# Patient Record
Sex: MALE | Race: WHITE | Employment: FULL TIME | URBAN - METROPOLITAN AREA
[De-identification: names, ages, dates, MRNs, and addresses within clinical notes are randomized per-mention and may not be internally consistent; named-entity substitution may affect disease eponyms.]

---

## 2018-10-11 ENCOUNTER — HOSPITAL ENCOUNTER (EMERGENCY)
Facility: CLINIC | Age: 28
Discharge: HOME | End: 2018-10-11
Attending: PHYSICIAN ASSISTANT | Admitting: PHYSICIAN ASSISTANT
Payer: COMMERCIAL

## 2018-10-11 VITALS
WEIGHT: 170 LBS | HEIGHT: 73 IN | RESPIRATION RATE: 18 BRPM | SYSTOLIC BLOOD PRESSURE: 157 MMHG | TEMPERATURE: 97.9 F | DIASTOLIC BLOOD PRESSURE: 77 MMHG | BODY MASS INDEX: 22.53 KG/M2 | HEART RATE: 63 BPM | OXYGEN SATURATION: 99 %

## 2018-10-11 DIAGNOSIS — H18.829 KERATITIS SECONDARY TO CONTACT LENS: ICD-10-CM

## 2018-10-11 DIAGNOSIS — H16.8 KERATITIS SECONDARY TO CONTACT LENS: ICD-10-CM

## 2018-10-11 PROCEDURE — G0463 HOSPITAL OUTPT CLINIC VISIT: HCPCS | Performed by: FAMILY MEDICINE

## 2018-10-11 PROCEDURE — 99204 OFFICE O/P NEW MOD 45 MIN: CPT | Mod: Z6 | Performed by: FAMILY MEDICINE

## 2018-10-11 RX ORDER — MOXIFLOXACIN 5 MG/ML
1 SOLUTION/ DROPS OPHTHALMIC
Qty: 1 BOTTLE | Refills: 0 | Status: SHIPPED | OUTPATIENT
Start: 2018-10-11 | End: 2018-10-11

## 2018-10-11 RX ORDER — TETRACAINE HYDROCHLORIDE 5 MG/ML
1-2 SOLUTION OPHTHALMIC ONCE
Status: DISCONTINUED | OUTPATIENT
Start: 2018-10-11 | End: 2018-10-11 | Stop reason: HOSPADM

## 2018-10-11 RX ORDER — OFLOXACIN 3 MG/ML
1 SOLUTION/ DROPS OPHTHALMIC EVERY 4 HOURS
Qty: 1 BOTTLE | Refills: 0 | Status: SHIPPED | OUTPATIENT
Start: 2018-10-11

## 2018-10-11 ASSESSMENT — ENCOUNTER SYMPTOMS
EYE REDNESS: 0
EYE PAIN: 1
EYE ITCHING: 0
EYE DISCHARGE: 0
PHOTOPHOBIA: 0

## 2018-10-11 ASSESSMENT — VISUAL ACUITY
OS: 20/20
OD: 20/20

## 2018-10-11 NOTE — ED AVS SNAPSHOT
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      Follow-up Information     [facility].    Why:  for further evaluation of eye.         Discharge Instructions       Patient to use eye drops as directed.     [facility] tomorrow for follow up (information given) patient to call in the morning.     Patient to not wear contacts until cleared by eye specialist.   Patient to return to Emergency Room if visual changes, fevers, or change in symptoms occur.         24 Hour Appointment Hotline       To make an appointment at any [facility] clinic, call [phone] ([phone]). If you don't have a family doctor or clinic, we will help you find one. [facility] clinics are conveniently located to serve the needs of you and your family.          ED Discharge Orders     OPHTHALMOLOGY ADULT REFERRAL       Your provider has referred you to: [facility]: [facility] [place] [phone]   [email or web address]  [facility] [phone]   [email or web address]  [place]  [phone]   [email or web address]  [place] [phone]   [email or web address]    Please be aware that coverage of these services is subject to the terms and limitations of your health insurance plan.  Call member services at your health plan with any benefit or coverage questions.      Please bring the following with you to your appointment:    (1) Any X-Rays, CTs or MRIs which have been performed.  Contact the facility where they were done  to arrange for  prior to your scheduled appointment.    (2) List of current medications  (3) This referral request   (4) Any documents/labs given to you for this referral                     Review of your medicines      START taking        Dose / Directions Last dose taken    moxifloxacin 0.5 % ophthalmic solution   Commonly known as:  VIGAMOX   Dose:  1 drop   Quantity:  1 Bottle        Apply 1 drop to eye every 4 hours (while awake) for 7 days   Refills:  0                Prescriptions were sent or printed at these locations (1 Prescription)                   [facility] [place], [place] - [address]   [address] [address]    Telephone:  [phone]   Fax:  [phone]   Hours:                  E-Prescribed (1 of 1)         moxifloxacin (VIGAMOX) 0.5 % ophthalmic solution                Orders Needing Specimen Collection     None      Pending Results     No orders found from 10/9/2018 to 10/12/2018.            Pending Culture Results     No orders found from 10/9/2018 to 10/12/2018.            Pending Results Instructions     If you had any lab results that were not finalized at the time of your Discharge, you can call the ED Lab Result RN at [phone]. You will be contacted by this team for any positive Lab results or changes in treatment. The nurses are available 7 days a week from 10A to 6:30P.  You can leave a message 24 hours per day and they will return your call.        Test Results From Your Hospital Stay               Thank you for choosing [facility]       Thank you for choosing [facility] for your care. Our goal is always to provide you with excellent care. Hearing back from our patients is one way we can continue to improve our services. Please take a few minutes to complete the written survey that you may receive in the mail after you visit with us. Thank you!        [employer] Information     [employer] lets you send messages to your doctor, view your test  "results, renew your prescriptions, schedule appointments and more. To sign up, go to [email or web address] . Click on \"Log in\" on the left side of the screen, which will take you to the Welcome page. Then click on \"Sign up Now\" on the right side of the page.     You will be asked to enter the access code listed below, as well as some personal information. Please follow the directions to create your username and password.     Your access code is: [email or web address]  Expires: 2019  8:02 PM     Your access code will  in 90 days. If you need help or a new code, please call your [facility] clinic or [phone].        Care EveryWhere ID     This is your Care EveryWhere ID. This could be used by other organizations to access your [facility] medical records  [id number]        Equal Access to Services     ROLO HADDAD : Haddominic Chavez, franco be, michele villanuevaalelfego marshall, simi rothman . So [facility] [phone].    ATENCIÓN: Si habla español, tiene a bauer disposición servicios gratuitos de asistencia lingüística. Llame al [phone].    We comply with applicable federal civil rights laws and [place] laws. We do not discriminate on the basis of race, color, national origin, age, disability, sex, sexual orientation, or gender identity.            After Visit Summary       This is your record. Keep this with you and show to your community pharmacist(s) and doctor(s) at your next visit.                  "

## 2018-10-11 NOTE — ED AVS SNAPSHOT
[facility] Emergency Department    [address] [address]    Phone:  [phone]    Fax:  [phone]                                       Patrick Peters   MRN: [id number]    Department:  [facility] Emergency Department   Date of Visit:  10/11/2018           After Visit Summary Signature Page     I have received my discharge instructions, and my questions have been answered. I have discussed any challenges I see with this plan with the nurse or doctor.    ..........................................................................................................................................  Patient/Patient Representative Signature      ..........................................................................................................................................  Patient Representative Print Name and Relationship to Patient    ..................................................               ................................................  Date                                   Time    ..........................................................................................................................................  Reviewed by Signature/Title    ...................................................              ..............................................  Date                                               Time          22EPIC Rev 08/18

## 2018-10-12 NOTE — ED PROVIDER NOTES
History     Chief Complaint   Patient presents with     Eye Problem     PT WEARS CONTACTS, THINKS A FB GOT UNDER CONTACT AND SCRATCHED EYE     HPI     Chief Complaint:   Chief Complaint   Patient presents with     Eye Problem     PT WEARS CONTACTS, THINKS A FB GOT UNDER CONTACT AND SCRATCHED EYE     History of Present Illness:  Patrick Peters is a 28 year old male who presents complaining of right eye pain that started today while driving home from work. Patient states he wears contacts while sleeping because they are day and night contacts. Patient states when he got home he took out his contacts. Patient concerned he may have gotten something under the eye. Patient has had a corneal abrasion and infection in the past and these symptoms are similar to corneal abrasion in past.   Onset/timing: sudden, unchanged.    Associated Signs and Symptoms: none  Treatment measures tried include: none  Contact wearer : Yes     last tetanus booster within past 5 years ago per patient; patient declined tetanus booster in office today.     Patient denies blurry vision or double vision, fevers, eyelid swelling, redness to eye, crusting or matting.     Problem list, Medication list, Allergies, and Medical/Social/Surgical histories reviewed in [facility] and updated as appropriate.          Patient declined tetanus states it was within past 5 years.     Problem List:    There are no active problems to display for this patient.       Past Medical History:    History reviewed. No pertinent past medical history.    Past Surgical History:    History reviewed. No pertinent surgical history.    Family History:    No family history on file.    Social History:  Marital Status:    Social History   Substance Use Topics     Smoking status: Light Tobacco Smoker     Smokeless tobacco: Never Used     Alcohol use Yes        Medications:      ofloxacin (OCUFLOX) 0.3 % ophthalmic solution         Review of Systems   Eyes: Positive for pain. Negative for  "photophobia, discharge, redness, itching and visual disturbance.   All other systems reviewed and are negative.      Physical Exam   BP: 157/77  Pulse: 63  Temp: 97.9  F (36.6  C)  Resp: 18  Height: 185.4 cm (6' 1\")  Weight: 77.1 kg (170 lb)  SpO2: 99 %  Visual Acuity-Left: 20/20  Visual Acuity-Right: 20/20      Physical Exam   Constitutional: He is oriented to person, place, and time. Vital signs are normal. He appears well-developed and well-nourished. He is cooperative. No distress.   HENT:   Head: Normocephalic and atraumatic.   Eyes: Conjunctivae, EOM and lids are normal. Pupils are equal, round, and reactive to light. Lids are everted and swept, no foreign bodies found. Right eye exhibits no chemosis, no discharge, no exudate and no hordeolum. No foreign body present in the right eye. Left eye exhibits no discharge and no exudate. Right conjunctiva is not injected. Right conjunctiva has no hemorrhage. Left conjunctiva is not injected. Left conjunctiva has no hemorrhage. No scleral icterus. Right eye exhibits normal extraocular motion and no nystagmus. Left eye exhibits normal extraocular motion and no nystagmus.   Slit lamp exam:       The right eye shows corneal ulcer (with white cloudy appearance noted at the 12 O'Clock position with clark lamp; Dr. Mann did the slit lamp exam . ) and fluorescein uptake. The right eye shows no corneal flare, no foreign body, no hyphema and no anterior chamber bulge.       Tetracaine eye drop placed in right eye prior to slit lamp procedure.    Neurological: He is alert and oriented to person, place, and time. He has normal strength. No sensory deficit. GCS eye subscore is 4. GCS verbal subscore is 5. GCS motor subscore is 6.   Skin: Skin is warm and intact. No rash noted. He is not diaphoretic. No erythema.   Psychiatric: He has a normal mood and affect. His behavior is normal. Judgment and thought content normal.       ED Course     ED Course     Procedures        "       Critical Care time:  none               No results found for this or any previous visit (from the past 24 hour(s)).    Medications   tetracaine (PONTOCAINE) 0.5 % ophthalmic solution 1-2 drop (not administered)       Assessments & Plan (with Medical Decision Making)     I have reviewed the nursing notes.    I have reviewed the findings, diagnosis, plan and need for follow up with the patient.   patient presents to the urgent care with right eye pain that started today while driving home from work. Patient is a contact lens wearer and sleeps with lens at night. Patient denied visual changes, fevers, photophobia, or drainage from eye. Patient presented to urgent care with eyeglasses on and had visual acuity done prior to history and physical exam. Patient had slit lamp procedure done by Dr. Mann today with keratitis noted from contact lens. Patient treated with moxifloxacin ophthalmic solution to right eye and informed [facility] for recheck. Patient informed of importance of treatment and close follow up and aware of risks if he does not do so. Patient declined tetanus booster in office today and states he is pretty sure he had a tetanus within the past 5 years.     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

## 2018-10-12 NOTE — DISCHARGE INSTRUCTIONS
Patient to use eye drops as directed.     [facility] for follow up (information given) patient to call in the morning.     Patient to not wear contacts until cleared by eye specialist.   Patient to return to Emergency Room if visual changes, fevers, or change in symptoms occur.